# Patient Record
Sex: FEMALE | Race: WHITE | URBAN - METROPOLITAN AREA
[De-identification: names, ages, dates, MRNs, and addresses within clinical notes are randomized per-mention and may not be internally consistent; named-entity substitution may affect disease eponyms.]

---

## 2022-12-29 ENCOUNTER — HOSPITAL ENCOUNTER (EMERGENCY)
Age: 32
Discharge: HOME | End: 2022-12-29
Attending: EMERGENCY MEDICINE

## 2022-12-29 ENCOUNTER — APPOINTMENT (OUTPATIENT)
Dept: ULTRASOUND IMAGING | Age: 32
End: 2022-12-29

## 2022-12-29 VITALS
BODY MASS INDEX: 25.61 KG/M2 | OXYGEN SATURATION: 99 % | DIASTOLIC BLOOD PRESSURE: 66 MMHG | WEIGHT: 150 LBS | HEIGHT: 64 IN | HEART RATE: 84 BPM | RESPIRATION RATE: 14 BRPM | TEMPERATURE: 98.6 F | SYSTOLIC BLOOD PRESSURE: 116 MMHG

## 2022-12-29 DIAGNOSIS — O46.90 VAGINAL BLEEDING IN PREGNANCY: Primary | ICD-10-CM

## 2022-12-29 DIAGNOSIS — O20.0 THREATENED MISCARRIAGE: ICD-10-CM

## 2022-12-29 LAB
A/G RATIO: 1.5 (ref 1.1–2.2)
ABO/RH: NORMAL
ALBUMIN SERPL-MCNC: 4.3 G/DL (ref 3.4–5)
ALP BLD-CCNC: 73 U/L (ref 40–129)
ALT SERPL-CCNC: 19 U/L (ref 10–40)
AMORPHOUS: ABNORMAL /HPF
ANION GAP SERPL CALCULATED.3IONS-SCNC: 10 MMOL/L (ref 3–16)
AST SERPL-CCNC: 20 U/L (ref 15–37)
BACTERIA WET PREP: NORMAL
BACTERIA: ABNORMAL /HPF
BASOPHILS ABSOLUTE: 0 K/UL (ref 0–0.2)
BASOPHILS RELATIVE PERCENT: 0.5 %
BILIRUB SERPL-MCNC: 0.3 MG/DL (ref 0–1)
BILIRUBIN URINE: NEGATIVE
BLOOD, URINE: ABNORMAL
BUN BLDV-MCNC: 10 MG/DL (ref 7–20)
CALCIUM SERPL-MCNC: 9.4 MG/DL (ref 8.3–10.6)
CHLORIDE BLD-SCNC: 101 MMOL/L (ref 99–110)
CLARITY: CLEAR
CLUE CELLS: NORMAL
CO2: 25 MMOL/L (ref 21–32)
COLOR: YELLOW
CREAT SERPL-MCNC: 0.6 MG/DL (ref 0.6–1.1)
EOSINOPHILS ABSOLUTE: 0.1 K/UL (ref 0–0.6)
EOSINOPHILS RELATIVE PERCENT: 1.5 %
EPITHELIAL CELLS WET PREP: NORMAL
EPITHELIAL CELLS, UA: ABNORMAL /HPF (ref 0–5)
GFR SERPL CREATININE-BSD FRML MDRD: >60 ML/MIN/{1.73_M2}
GLUCOSE BLD-MCNC: 92 MG/DL (ref 70–99)
GLUCOSE URINE: NEGATIVE MG/DL
GONADOTROPIN, CHORIONIC (HCG) QUANT: 253.7 MIU/ML
HCT VFR BLD CALC: 40.3 % (ref 36–48)
HEMOGLOBIN: 13.8 G/DL (ref 12–16)
KETONES, URINE: 15 MG/DL
LEUKOCYTE ESTERASE, URINE: NEGATIVE
LIPASE: 36 U/L (ref 13–60)
LYMPHOCYTES ABSOLUTE: 2 K/UL (ref 1–5.1)
LYMPHOCYTES RELATIVE PERCENT: 27.8 %
MCH RBC QN AUTO: 32.9 PG (ref 26–34)
MCHC RBC AUTO-ENTMCNC: 34.4 G/DL (ref 31–36)
MCV RBC AUTO: 95.7 FL (ref 80–100)
MICROSCOPIC EXAMINATION: YES
MONOCYTES ABSOLUTE: 0.5 K/UL (ref 0–1.3)
MONOCYTES RELATIVE PERCENT: 7.5 %
MUCUS: ABNORMAL /LPF
NEUTROPHILS ABSOLUTE: 4.5 K/UL (ref 1.7–7.7)
NEUTROPHILS RELATIVE PERCENT: 62.7 %
NITRITE, URINE: NEGATIVE
PDW BLD-RTO: 13.2 % (ref 12.4–15.4)
PH UA: 7 (ref 5–8)
PLATELET # BLD: 309 K/UL (ref 135–450)
PMV BLD AUTO: 6.8 FL (ref 5–10.5)
POTASSIUM REFLEX MAGNESIUM: 4.1 MMOL/L (ref 3.5–5.1)
PROTEIN UA: NEGATIVE MG/DL
RBC # BLD: 4.21 M/UL (ref 4–5.2)
RBC UA: ABNORMAL /HPF (ref 0–4)
RBC WET PREP: NORMAL
SODIUM BLD-SCNC: 136 MMOL/L (ref 136–145)
SOURCE WET PREP: NORMAL
SPECIFIC GRAVITY UA: 1.02 (ref 1–1.03)
TOTAL PROTEIN: 7.1 G/DL (ref 6.4–8.2)
TRICHOMONAS PREP: NORMAL
URINE REFLEX TO CULTURE: ABNORMAL
URINE TYPE: ABNORMAL
UROBILINOGEN, URINE: 0.2 E.U./DL
WBC # BLD: 7.2 K/UL (ref 4–11)
WBC UA: ABNORMAL /HPF (ref 0–5)
WBC WET PREP: NORMAL
YEAST WET PREP: NORMAL

## 2022-12-29 PROCEDURE — 87591 N.GONORRHOEAE DNA AMP PROB: CPT

## 2022-12-29 PROCEDURE — 80053 COMPREHEN METABOLIC PANEL: CPT

## 2022-12-29 PROCEDURE — 86900 BLOOD TYPING SEROLOGIC ABO: CPT

## 2022-12-29 PROCEDURE — 85025 COMPLETE CBC W/AUTO DIFF WBC: CPT

## 2022-12-29 PROCEDURE — 83690 ASSAY OF LIPASE: CPT

## 2022-12-29 PROCEDURE — 87491 CHLMYD TRACH DNA AMP PROBE: CPT

## 2022-12-29 PROCEDURE — 81001 URINALYSIS AUTO W/SCOPE: CPT

## 2022-12-29 PROCEDURE — 76817 TRANSVAGINAL US OBSTETRIC: CPT

## 2022-12-29 PROCEDURE — 87210 SMEAR WET MOUNT SALINE/INK: CPT

## 2022-12-29 PROCEDURE — 84702 CHORIONIC GONADOTROPIN TEST: CPT

## 2022-12-29 PROCEDURE — 86901 BLOOD TYPING SEROLOGIC RH(D): CPT

## 2022-12-29 PROCEDURE — 99284 EMERGENCY DEPT VISIT MOD MDM: CPT

## 2022-12-29 ASSESSMENT — ENCOUNTER SYMPTOMS
CHEST TIGHTNESS: 0
BACK PAIN: 0
NAUSEA: 0
ABDOMINAL PAIN: 1
DIARRHEA: 0
SORE THROAT: 0
COUGH: 0
SHORTNESS OF BREATH: 0
RHINORRHEA: 0
VOMITING: 0
BLOOD IN STOOL: 0
CONSTIPATION: 0

## 2022-12-29 ASSESSMENT — PAIN DESCRIPTION - LOCATION: LOCATION: ABDOMEN

## 2022-12-29 ASSESSMENT — PAIN SCALES - GENERAL: PAINLEVEL_OUTOF10: 2

## 2022-12-29 ASSESSMENT — PAIN DESCRIPTION - ORIENTATION: ORIENTATION: RIGHT

## 2022-12-29 ASSESSMENT — PAIN - FUNCTIONAL ASSESSMENT: PAIN_FUNCTIONAL_ASSESSMENT: 0-10

## 2022-12-29 NOTE — DISCHARGE INSTRUCTIONS
Need to have a repeat beta-hCG collected on 12/31, an order was placed for the outpatient lab, please have this collected at [facility]. Please call your OB office tomorrow to schedule an appointment for next week. I did speak with Dr. Murillo who requested that on the 31st to call their office at [phone] after having your labs drawn so that they can evaluate these.

## 2022-12-29 NOTE — ED PROVIDER NOTES
I independently performed a history and physical on Figueroa Barajas. All diagnostic, treatment, and disposition decisions were made by myself in conjunction with the advanced practice provider/resident. For further details of Figueroa Barajas emergency department encounter, please see the KEARA/resident's documentation. I personally saw the patient and performed a substantive portion of the visit including all aspects of the medical decision making. Briefly, this is a 42-year-old female presenting for evaluation of vaginal bleeding in the setting of early pregnancy. Patient is not sure how far along of pregnancy she would be as she last had a period about a month ago. She denies any fevers. She has had intermittent vaginal bleeding for the past 10 to 11 days. On exam, she has stable vital signs, no fever. She has no significant abdominal tenderness no rebound or rigidity. Pelvic ultrasound obtained which does show empty gestational sac in the lower uterine segment, concerning for miscarriage in process, she also has mildly dilated cervical os on pelvic exam.  Patient counseled on suspected miscarriage in process, OB will be consulted. She is A positive and does not require RhoGAM..    I, Dr. Shanell Bowman, am the primary clinician of record. Comment: Please note this report has been produced using speech recognition software and may contain errors related to that system including errors in grammar, punctuation, and spelling, as well as words and phrases that may be inappropriate. If there are any questions or concerns please feel free to contact the dictating provider for clarification.      Brooks Rodriguez MD  12/29/22 1118

## 2022-12-29 NOTE — ED NOTES
Pt discharged after reviewing AVS and follow up care. Pt verbalizes understandings and ambulated with family at side from department.       Myranda Davis RN  12/29/22 9395

## 2022-12-29 NOTE — ED PROVIDER NOTES
[facility]  ED  EMERGENCY DEPARTMENT ENCOUNTER        Pt Name: Susan Carpio  MRN: [id number]  [place] 1990  Date of evaluation: 12/29/2022  Provider: RACIEL Cobian CNP  PCP: No primary care provider on file. This patient was seen and evaluated by the attending physician Abi Burch MD.    I have evaluated this patient. My supervising physician was available for consultation. CHIEF COMPLAINT       Chief Complaint   Patient presents with    Vaginal Bleeding     Started 12-13 days ago, started with clots and now bleeding has slowed down, with abd cramping more on right side,        HISTORY OF PRESENT ILLNESS   (Location/Symptom, Timing/Onset, Context/Setting, Quality, Duration, Modifying Factors, Severity)  Note limiting factors. Susan Carpio is a 29 y.o. female who presents via private car for vaginal bleeding, concern for ectopic pregnancy. Onset was vaginal bleeding began 12/16, abdominal pain began yesterday. Duration has been since the onset. Context includes patient presents to the emergency department today with complaints of vaginal bleeding and lower abdominal pain. She states that her last period prior to this episode of bleeding was on 11/17 and lasted 2 days. She was seen by her OB on 12/9 for abnormal menses and they thought that it was \"due to stress\". She states that on 12/16 she began with heavy vaginal bleeding and was saturating 1 pad every 2-3 hours. She states that she spoke with her OB this week and was instructed to take a pregnancy test.  She had a positive pregnancy test yesterday and this morning and due to her abdominal pain and bleeding was instructed to come to the emergency department. She is G1, P0. She denies any chest pain, palpitations or swelling in her extremities. She denies any shortness of breath, cough congestion or fevers. She denies any nausea, vomiting, diarrhea or constipation.   She does endorse lower abdominal pain in the right lower and left lower quadrants. She denies any urinary symptoms including dysuria, urgency, frequency, flank pain. Prior to noticing vaginal bleeding she denies any vaginal discharge. Quality is dull and aching and cramping with radiation to her abdomen. Alleviating factors include nothing. Aggravating factors include nothing. Pain is 2/10. Nothing has been used for pain today. Chart review reveals pt has significant PMHx of no significant past medical history. They take no medications. Nursing Notes were all reviewed and agreed with or any disagreements were addressed  in the HPI. Pt was seen during the [place] 19 pandemic. Appropriate PPE worn by ME during patient encounters. Pt seen during a time with constrained hospital bed capacity and other potential inpatient and outpatient resources were constrained due to the viral pandemic. REVIEW OF SYSTEMS    (2-9 systems for level 4, 10 or more for level 5)     Review of Systems   Constitutional:  Negative for chills, diaphoresis and fever. HENT:  Negative for congestion, rhinorrhea and sore throat. Respiratory:  Negative for cough, chest tightness and shortness of breath. Cardiovascular:  Negative for chest pain, palpitations and leg swelling. Gastrointestinal:  Positive for abdominal pain. Negative for blood in stool, constipation, diarrhea, nausea and vomiting. Genitourinary:  Positive for hematuria, menstrual problem and pelvic pain. Negative for dysuria, flank pain, frequency and urgency. Musculoskeletal:  Negative for back pain, neck pain and neck stiffness. Skin:  Negative for rash and wound. Neurological:  Negative for dizziness, weakness, light-headedness and headaches. Positives and Pertinent negatives as per HPI. Except as noted abovein the ROS, all other systems were reviewed and negative. PAST MEDICAL HISTORY   History reviewed. No pertinent past medical history.       SURGICAL HISTORY     Past Surgical History:   Procedure Laterality Date    ANKLE SURGERY      CHOLECYSTECTOMY           CURRENTMEDICATIONS       Discharge Medication List as of 12/29/2022  2:05 PM        CONTINUE these medications which have NOT CHANGED    Details   naproxen (NAPROSYN) 500 MG tablet Take 1 tablet by mouth 2 times daily. , Disp-20 tablet, R-0               ALLERGIES     Patient has no known allergies. FAMILYHISTORY     History reviewed. No pertinent family history. SOCIAL HISTORY       Social History     Socioeconomic History    Marital status: Single     Spouse name: None    Number of children: None    Years of education: None    Highest education level: None   Tobacco Use    Smoking status: Every Day     Packs/day: 1.00     Types: Cigarettes       SCREENINGS    Powell Coma Scale  Eye Opening: Spontaneous  Best Verbal Response: Oriented  Best Motor Response: Obeys commands  Powell Coma Scale Score: 15        PHYSICAL EXAM    (up to 7 for level 4, 8 or more for level 5)     ED Triage Vitals [12/29/22 1005]   BP Temp Temp Source Heart Rate Resp SpO2 Height Weight   126/77 98.6 °F (37 °C) Oral 80 19 100 % 5' 4\" (1.626 m) 150 lb (68 kg)       Physical Exam  Vitals and nursing note reviewed. Exam conducted with a chaperone present. Constitutional:       Appearance: Normal appearance. She is not ill-appearing or diaphoretic. HENT:      Head: Normocephalic and atraumatic. Right Ear: External ear normal.      Left Ear: External ear normal.      Mouth/Throat:      Mouth: Mucous membranes are moist.      Pharynx: Oropharynx is clear. Eyes:      General:         Right eye: No discharge. Left eye: No discharge. Cardiovascular:      Rate and Rhythm: Normal rate and regular rhythm. Pulses: Normal pulses. Radial pulses are 2+ on the right side and 2+ on the left side. Heart sounds: Normal heart sounds. No murmur heard. No friction rub. No gallop.    Pulmonary: Effort: Pulmonary effort is normal. No respiratory distress. Breath sounds: Normal breath sounds. No stridor. No wheezing, rhonchi or rales. Abdominal:      General: Abdomen is flat. Bowel sounds are normal.      Palpations: Abdomen is soft. Tenderness: There is abdominal tenderness. There is no right CVA tenderness or left CVA tenderness. Hernia: No hernia is present. There is no hernia in the left inguinal area or right inguinal area. Genitourinary:     General: Normal vulva. Exam position: Lithotomy position. Pubic Area: No rash or pubic lice. Labia:         Right: No rash, tenderness, lesion or injury. Left: No rash, tenderness, lesion or injury. Vagina: Normal.      Cervix: Dilated. Cervical bleeding present. Uterus: Normal.       Adnexa:         Right: Tenderness present. Left: Tenderness present. Rectum: Normal.   Musculoskeletal:         General: Normal range of motion. Cervical back: Normal range of motion and neck supple. Lymphadenopathy:      Lower Body: No right inguinal adenopathy. No left inguinal adenopathy. Skin:     General: Skin is warm and dry. Capillary Refill: Capillary refill takes less than 2 seconds. Neurological:      General: No focal deficit present. Mental Status: She is alert and oriented to person, place, and time. GCS: GCS eye subscore is 4. GCS verbal subscore is 5. GCS motor subscore is 6.    Psychiatric:         Mood and Affect: Mood normal.         Behavior: Behavior normal.     PHYSICAL EXAM  /66   Pulse 84   Temp 98.6 °F (37 °C) (Oral)   Resp 14   Ht 5' 4\" (1.626 m)   Wt 150 lb (68 kg)   LMP 11/29/2022   SpO2 99%   BMI 25.75 kg/m²       DIAGNOSTIC RESULTS   LABS:    Labs Reviewed   URINALYSIS WITH REFLEX TO CULTURE - Abnormal; Notable for the following components:       Result Value    Ketones, Urine 15 (*)     Blood, Urine SMALL (*)     All other components within normal limits   MICROSCOPIC URINALYSIS - Abnormal; Notable for the following components:    Mucus, UA Rare (*)     Bacteria, UA Rare (*)     All other components within normal limits   WET PREP, GENITAL   C.TRACHOMATIS N.GONORRHOEAE DNA   CBC WITH AUTO DIFFERENTIAL   COMPREHENSIVE METABOLIC PANEL W/ REFLEX TO MG FOR LOW K   HCG, QUANTITATIVE, PREGNANCY   LIPASE   ABO/RH   TYPE AND SCREEN       All other labs were within normal range or not returned as of this dictation. EKG: All EKG's are interpreted by the Emergency Department Physician who either signs orCo-signs this chart in the absence of a cardiologist.  Please see their note for interpretation of EKG. RADIOLOGY:   Non-plain film images such as CT, Ultrasound and MRI are read by the radiologist. [place] radiographic images are visualized andpreliminarily interpreted by the  ED Provider with the below findings:        Interpretation perthe Radiologist below, if available at the time of this note:    US OB TRANSVAGINAL   Final Result   A small empty intrauterine sac is seen at the lower uterine segment. Some   differential considerations include early intrauterine pregnancy, ectopic   pregnancy, or sequela of spontaneous . Continued beta HCG, clinical,   and ultrasound follow-up recommended. 1.5 cm complex cystic lesion within the left ovary for which follow-up   ultrasound in 6-12 weeks could be performed to ensure resolution. Small amount of free pelvic fluid. No results found.        PROCEDURES   Unless otherwise noted below, none     Procedures    CRITICAL CARE TIME   N/A    CONSULTS:  IP CONSULT TO OB GYN      EMERGENCY DEPARTMENT COURSE and DIFFERENTIALDIAGNOSIS/MDM:   Vitals:    Vitals:    22 1005 22 1218 22 1404   BP: 126/77 112/69 116/66   Pulse: 80 89 84   Resp: 19 16 14   Temp: 98.6 °F (37 °C)     TempSrc: Oral     SpO2: 100% 98% 99%   Weight: 150 lb (68 kg)     Height: 5' 4\" (1.626 m) Patient was given thefollowing medications:  Medications - No data to display    PDMP Monitoring:    Last PDMP Steven as Reviewed [facility]):  Review User Review Instant Review Result            Urine Drug Screenings (1 yr)    No resulted procedures found. Medication Contract and Consent for Opioid Use Documents Filed        No documents found                    MDM:   This patient was seen and evaluated by myself and my attending physician. She presents to the emergency department today with lower abdominal pain as well as vaginal bleeding since 12/16 after taking a positive pregnancy test at home this morning she was instructed by her OB to come to the emergency department to rule out ectopic pregnancy. She is G1, P0. On exam she is alert and oriented, hemodynamically stable nontoxic in appearance. She denies any for pain control at this time however states she will notify staff if this changes. She will have a work-up in the emergency department consisting of laboratory studies and imaging as well as a pelvic exam.  She does endorse that her vaginal bleeding has lightened up considerably over the last 24 to 48 hours and now is just generally spotting. CBC is negative for leukocytosis or anemias. CMP is negative for electrolyte dyscrasias. hCG quantitative is positive at 253. Lipase is 36. Urinalysis is positive for 15 ketones and a small amount of blood. Otherwise negative for nitrites or leukocytes  Wet prep was negative for trichomonas, yeast or clue cells. Microscopic urinalysis had rare bacteria and rare mucus. Patient is a positive  Ultrasound OB transvaginal interpreted by the radiologist for small empty intrauterine sac seen in the lower uterine segment. Some differential considerations include early intrauterine pregnancy, ectopic or sequelae of spontaneous miscarriage. Continued beta hCG clinical and ultrasound follow-up recommended.   There is a 1.5 cm complex cystic lesion within the left ovary for which follow-up ultrasound in 6 to 12-week be performed to ensure resolution. Did discuss the patient's findings with Dr. Mihai Mcmahan with [facility]. She did request that the patient have a follow-up hCG quantitative on 12/31, an order was placed for this to be collected as an outpatient at [facility]. The patient was instructed to come to [facility] for the blood draw to ensure the same lab use. She was also instructed to follow-up with OB to call and schedule an appointment for next week as well as call on Friday after her laboratory studies were collected to that they could evaluate these. I did provide the patient with the phone number as well as placed on her discharge paperwork. I instructed her to come on 12/31 to have the blood collected and to immediately call the physicians office afterwards. Mihai Mcmahan felt the patient was safe for discharge based off of her clinical appearance here in the emergency department as well as her vital signs. In discussing the patient's ultrasound findings with her I did instruct her that this could be an early miscarriage however ectopic cannot be ruled out at this time so she needed to monitor her symptoms closely. She did verbalize understanding of this. She was provided with strict return precautions for the emergency department including but not limited to worsening abdominal pain, fevers, increasing vaginal bleeding or other concerns. The patient did verbalize understanding of all discharge teaching and was ultimately discharged in a stable condition with all questions answered. Is this patient to be included in the SEP-1 Core Measure due to severe sepsis or septic shock?    No   Exclusion criteria - the patient is NOT to be included for SEP-1 Core Measure due to:  2+ SIRS criteria are not met    Discharge Time out:  CC Reviewed Yes   Test Results Yes     Vitals:    12/29/22 1404   BP: 116/66   Pulse: 84   Resp: 14   Temp:    SpO2: 99% FINAL IMPRESSION      1. Vaginal bleeding in pregnancy    2.  Threatened miscarriage          DISPOSITION/PLAN   DISPOSITION Decision To Discharge 12/29/2022 01:42:52 PM      PATIENT REFERREDTO:  [facility] [address]  [phone]    Re-evaluation    DISCHARGE MEDICATIONS:  Discharge Medication List as of 12/29/2022  2:05 PM          DISCONTINUED MEDICATIONS:  Discharge Medication List as of 12/29/2022  2:05 PM                 (Please note that portions ofthis note were completed with a voice recognition program.  Efforts were made to edit the dictations but occasionally words are mis-transcribed.)    RACIEL Ramos CNP (electronically signed)       RACIEL Ramos CNP  12/29/22 3401

## 2022-12-30 LAB
C TRACH DNA GENITAL QL NAA+PROBE: NEGATIVE
N. GONORRHOEAE DNA: NEGATIVE

## 2022-12-31 ENCOUNTER — HOSPITAL ENCOUNTER (OUTPATIENT)
Age: 32
Setting detail: SPECIMEN
Discharge: HOME | End: 2022-12-31

## 2022-12-31 DIAGNOSIS — O46.90 VAGINAL BLEEDING IN PREGNANCY: ICD-10-CM

## 2022-12-31 DIAGNOSIS — O20.0 THREATENED MISCARRIAGE: ICD-10-CM

## 2022-12-31 LAB — GONADOTROPIN, CHORIONIC (HCG) QUANT: 40 MIU/ML

## 2022-12-31 PROCEDURE — 36415 COLL VENOUS BLD VENIPUNCTURE: CPT

## 2022-12-31 PROCEDURE — 84702 CHORIONIC GONADOTROPIN TEST: CPT
